# Patient Record
Sex: MALE | Race: WHITE
[De-identification: names, ages, dates, MRNs, and addresses within clinical notes are randomized per-mention and may not be internally consistent; named-entity substitution may affect disease eponyms.]

---

## 2022-09-25 ENCOUNTER — HOSPITAL ENCOUNTER (INPATIENT)
Dept: HOSPITAL 97 - ER | Age: 57
LOS: 2 days | Discharge: HOME | DRG: 544 | End: 2022-09-27
Attending: INTERNAL MEDICINE | Admitting: HOSPITALIST
Payer: SELF-PAY

## 2022-09-25 VITALS — OXYGEN SATURATION: 98 %

## 2022-09-25 VITALS — BODY MASS INDEX: 26.6 KG/M2

## 2022-09-25 DIAGNOSIS — Z79.899: ICD-10-CM

## 2022-09-25 DIAGNOSIS — Z28.310: ICD-10-CM
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
## 2022-09-25 DIAGNOSIS — Z20.822: ICD-10-CM

## 2022-09-25 DIAGNOSIS — Z60.2: ICD-10-CM

## 2022-09-25 DIAGNOSIS — R33.8: ICD-10-CM

## 2022-09-25 DIAGNOSIS — Z87.891: ICD-10-CM

## 2022-09-25 DIAGNOSIS — C61: ICD-10-CM

## 2022-09-25 DIAGNOSIS — M84.58XA: Primary | ICD-10-CM

## 2022-09-25 DIAGNOSIS — M13.862: ICD-10-CM

## 2022-09-25 LAB
ALBUMIN SERPL BCP-MCNC: 4.4 G/DL (ref 3.4–5)
ALP SERPL-CCNC: 80 U/L (ref 45–117)
ALT SERPL W P-5'-P-CCNC: 54 U/L (ref 12–78)
AST SERPL W P-5'-P-CCNC: 36 U/L (ref 15–37)
BUN BLD-MCNC: 14 MG/DL (ref 7–18)
GLUCOSE SERPLBLD-MCNC: 118 MG/DL (ref 74–106)
HCT VFR BLD CALC: 44.6 % (ref 39.6–49)
LIPASE SERPL-CCNC: 119 U/L (ref 73–393)
LYMPHOCYTES # SPEC AUTO: 1.4 K/UL (ref 0.7–4.9)
MCV RBC: 91.1 FL (ref 80–100)
PMV BLD: 7.3 FL (ref 7.6–11.3)
POTASSIUM SERPL-SCNC: 4.4 MMOL/L (ref 3.5–5.1)
RBC # BLD: 4.9 M/UL (ref 4.33–5.43)

## 2022-09-25 PROCEDURE — 99285 EMERGENCY DEPT VISIT HI MDM: CPT

## 2022-09-25 PROCEDURE — 74177 CT ABD & PELVIS W/CONTRAST: CPT

## 2022-09-25 PROCEDURE — 85610 PROTHROMBIN TIME: CPT

## 2022-09-25 PROCEDURE — 51702 INSERT TEMP BLADDER CATH: CPT

## 2022-09-25 PROCEDURE — 80053 COMPREHEN METABOLIC PANEL: CPT

## 2022-09-25 PROCEDURE — 71260 CT THORAX DX C+: CPT

## 2022-09-25 PROCEDURE — 82565 ASSAY OF CREATININE: CPT

## 2022-09-25 PROCEDURE — 36415 COLL VENOUS BLD VENIPUNCTURE: CPT

## 2022-09-25 PROCEDURE — 83690 ASSAY OF LIPASE: CPT

## 2022-09-25 PROCEDURE — 87811 SARS-COV-2 COVID19 W/OPTIC: CPT

## 2022-09-25 PROCEDURE — 78306 BONE IMAGING WHOLE BODY: CPT

## 2022-09-25 PROCEDURE — 85025 COMPLETE CBC W/AUTO DIFF WBC: CPT

## 2022-09-25 PROCEDURE — 81003 URINALYSIS AUTO W/O SCOPE: CPT

## 2022-09-25 PROCEDURE — 85730 THROMBOPLASTIN TIME PARTIAL: CPT

## 2022-09-25 PROCEDURE — 84153 ASSAY OF PSA TOTAL: CPT

## 2022-09-25 RX ADMIN — HYDROMORPHONE HYDROCHLORIDE PRN MG: 1 INJECTION, SOLUTION INTRAMUSCULAR; INTRAVENOUS; SUBCUTANEOUS at 23:33

## 2022-09-25 RX ADMIN — Medication SCH ML: at 21:00

## 2022-09-25 RX ADMIN — SODIUM CHLORIDE SCH MLS: 0.9 INJECTION, SOLUTION INTRAVENOUS at 20:27

## 2022-09-25 SDOH — SOCIAL STABILITY - SOCIAL INSECURITY: PROBLEMS RELATED TO LIVING ALONE: Z60.2

## 2022-09-25 NOTE — EDPHYS
Physician Documentation                                                                           

 [facility]                                                                 

Name: Rafael Vásquez                                                                                

Age: 57 yrs                                                                                       

Sex: Male                                                                                         

: 1965                                                                                   

MRN: [id number]                                                                                   

Arrival Date: 2022                                                                          

Time: 11:00                                                                                       

Account#: [id number]                                                                            

Bed 6                                                                                             

Private MD:                                                                                       

ED Physician Suleman Quinonez                                                                         

HPI:                                                                                              

                                                                                             

11:25 This 57 yrs old Male presents to ER via Ambulatory with complaints of Flank Pain.       jmm 

11:25 The patient complains of pain in the right flank. Onset: The symptoms/episode           jmm 

      began/occurred acutely, last night. Modifying factors: The symptoms are alleviated by       

      remaining still, the symptoms are aggravated by movement, palpation/percussion.             

      Associated signs and symptoms: Pertinent negatives: vomiting. Is a 57-year-old male         

      with no known chronic medical conditions presents emerged part with complaints of right     

      flank/rib pain beginning last night while he was performing heavy lifting. Denies           

      shortness of breath, vomiting, dysuria..                                                    

                                                                                                  

Historical:                                                                                       

- Allergies:                                                                                      

11:14 No Known Allergies;                                                                     tw2 

- Home Meds:                                                                                      

11:14 None [Active];                                                                          tw2 

- PMHx:                                                                                           

11:14 None;                                                                                   tw2 

- PSHx:                                                                                           

11:14 None;                                                                                   tw2 

                                                                                                  

- Immunization history:: Client reports having NOT received the Covid vaccine.                    

- Social history:: Smoking status: Patient denies any tobacco usage or history of.                

                                                                                                  

                                                                                                  

ROS:                                                                                              

11:25 Constitutional: Negative for fever, chills, and weight loss, Cardiovascular: Negative   jmm 

      for chest pain, palpitations, and edema, Respiratory: Negative for shortness of breath,     

      cough, wheezing, and pleuritic chest pain.                                                  

11:25 Back: Positive for flank pain.                                                              

11:25 All other systems are negative.                                                             

                                                                                                  

Exam:                                                                                             

11:25 Head/Face:  atraumatic. Eyes:  EOMI, no conjunctival erythema appreciated ENT:  Moist   jmm 

      Mucus Membranes Neck:  Trachea midline, Supple Cardiovascular:  Regular rate and            

      rhythm.  No edema appreciated                                                               

11:25 Respiratory:  Normal respirations, no respiratory distress appreciated Back:  Normal        

      ROM Skin:  General appearance color normal MS/ Extremity:  Moves all extremities, no        

      obvious deformities appreciated, no edema noted to the lower extremities  Neuro:  Awake     

      and alert Psych:  Behavior is normal, Mood is normal, Patient is cooperative and            

      pleasant                                                                                    

11:25 Constitutional: The patient appears alert, awake, anxious, uncomfortable.                   

11:25 Chest/axilla: Palpation: tenderness, that is moderate, of the  right lateral posterior      

      chest.                                                                                      

                                                                                                  

Vital Signs:                                                                                      

11:11  / 113; Pulse 98; Resp 17; Temp 98.8(TE); Pulse Ox 97% on R/A; Weight 79.38 kg    tw2 

      (R); Height 5 ft. 8 in. (172.72 cm); Pain 10/10;                                            

15:24  / 104; Pulse 93; Resp 18; Pulse Ox 100% on R/A;                                  ll1 

16:30  / 102; Pulse 93; Pulse Ox 96% ;                                                  kr3 

18:32  / 100; Pulse 92; Resp 18; Pulse Ox 97% ;                                         kr3 

11:11 Body Mass Index 26.61 (79.38 kg, 172.72 cm)                                             tw2 

                                                                                                  

MDM:                                                                                              

11:26 Patient medically screened.                                                             [facility] 

14:12 Data reviewed: vital signs, nurses notes. Counseling: I had a detailed discussion with  [facility] 

      the patient and/or guardian regarding: the historical points, exam findings, and any        

      diagnostic results supporting the discharge/admit diagnosis, lab results, radiology         

      results, the need to transfer to another facility. ED course: We do not have urology on     

      call today. Patient has a preference for [facility]. .                      

15:08 ED course: [facility]. I did contact urology from [facility] in   [facility] 

      the [facility] who stated that the patient did not need inpatient care on a       

      urologic standpoint. I discussed the patient with the hospitalist who declined              

      transfer. I discussed the patient  who recommends admission to this hospital to     

      contact Dr. Sears. Dr. Sears was contacted via text..                                         

                                                                                                  

                                                                                             

11:25 Order name: CBC with Diff; Complete Time: 12:32                                         [facility] 

                                                                                             

11:25 Order name: CMP; Complete Time: 12:32                                                   [facility] 

                                                                                             

11:25 Order name: Lipase; Complete Time: 12:32                                                [facility] 

                                                                                             

13:29 Order name: SARS RAPID; Complete Time: 14:02                                              

                                                                                             

13:45 Order name: Urine Dipstick-Ancillary; Complete Time: 13:45                              [facility]

                                                                                             

16:52 Order name: CBC with Automated Diff                                                     [facility]

                                                                                             

16:52 Order name: CBC with Automated Diff                                                     [facility]

                                                                                             

16:52 Order name: Comprehensive Metabolic Panel                                               [facility]

                                                                                             

16:52 Order name: Comprehensive Metabolic Panel                                               [facility]

                                                                                             

16:52 Order name: Protime (+INR)                                                              [facility]

                                                                                             

16:52 Order name: Protime (+INR)                                                              [facility]

                                                                                             

16:52 Order name: PSA Diagnostic                                                              [facility]

                                                                                             

16:52 Order name: PSA Diagnostic                                                              [facility]

                                                                                             

16:52 Order name: PTT, Activated Partial Thromb                                               [facility]

                                                                                             

11:25 Order name: IV Saline Lock; Complete Time: 12:59                                        [facility] 

                                                                                             

11:25 Order name: Labs collected and sent; Complete Time: 12:59                               [facility] 

                                                                                             

11:25 Order name: Urine Dipstick-Ancillary (obtain specimen); Complete Time: 13:43            [facility] 

                                                                                             

11:25 Order name: CT Chest, Abdomen, Pelvis - W/Contrast; Complete Time: 12:58                [facility] 

                                                                                             

13:10 Order name: Jack; Complete Time: 13:13                                                 [facility] 

                                                                                             

16:52 Order name: Regular                                                                     [facility]

                                                                                             

16:52 Order name: PTT, Activated Partial Thromb                                               [facility]

                                                                                             

16:52 Order name: Bone Imaging Whole Body                                                     [facility]

                                                                                             

16:52 Order name: Bone Imaging Whole Body                                                     [facility]

                                                                                                  

Administered Medications:                                                                         

13:05 Drug: morphine 4 mg Route: IVP; Infused Over: 4 mins; Site: right antecubital;          kr3 

15:22 Follow up: Response: No adverse reaction; RASS: Alert and Calm (0)                      ll1 

13:05 Drug: Zofran (Ondansetron) 4 mg Route: IVP; Site: right antecubital;                    kr3 

15:21 Follow up: Response: No adverse reaction; RASS: Alert and Calm (0)                      ll1 

13:22 Drug: Dilaudid (HYDROmorphone) 1 mg Route: IVP; Site: left antecubital;                 kr3 

15:21 Follow up: Response: No adverse reaction; RASS: Alert and Calm (0)                      ll1 

15:20 Drug: Dilaudid (HYDROmorphone) 1 mg Route: IVP; Site: left antecubital;                 ll1 

                                                                                                  

                                                                                                  

Disposition Summary:                                                                              

22 15:34                                                                                    

Hospitalization Ordered                                                                           

      Hospitalization Status: Observation                                                     dann 

      Provider: Sabrina Sears 

      Location: Telemetry/MedSurg (observation)                                               jmm 

      Condition: Stable(22 15:34)                                                       jmm 

      Problem: new(22 15:34)                                                            jmm 

      Symptoms: are unchanged(22 15:34)                                                 jmm 

      Bed/Room Type: Standard                                                                 m 

      Room Assignment: 404(22 17:57)                                                    dw  

      Diagnosis                                                                                   

        - Right 9th and 10th Fracture - Intractable Pain                                      jmm 

      Forms:                                                                                      

        - Medication Reconciliation Form                                                      jmm 

        - SBAR form                                                                           jmm 

Addendum:                                                                                         

2022                                                                                        

     07:28 Co-signature as Attending Physician, Suleman Quinonez MD.                                    r
n

                                                                                                  

Signatures:                                                                                       

Dispatcher [employer]                           EDBasia Orantes RN                        RN   dw                                                   

Emigdio Aguirre, PA                       PA   m                                                  

Suleman Quinonez MD MD   rn                                                   

Angely Malone RN                          RN   tw2                                                  

Arya Dolan RN                       RN   ll1                                                  

Hanane Adan RN                        RN   kr3                                                  

                                                                                                  

Corrections: (The following items were deleted from the chart)                                    

                                                                                             

14:49 14:14 Urology jmm                                                                       m 

14:49 14:14 [facility]                                                       m 

14:49 14:14 Higher level of care jmm                                                          m 

14:49 14:14 Stable jmm                                                                        jmm 

14:49 14:14 new jmm                                                                           jmm 

14:49 14:14 are unchanged jmm                                                                 m 

14:49 14:14 Bladder Outlet Obstruction jmm                                                    jmm 

14:49 14:14 Rib fracture jmm                                                                  jmm 

14:49 14:14 Bladder Mass jmm                                                                  jmm 

17:57 15:34 jmm                                                                               dw  

                                                                                                  

**************************************************************************************************

## 2022-09-25 NOTE — XMS REPORT
Continuity of Care Document

                          Created on:2022



Patient:SARITA ARREDONDO

Sex:Male

:1965

External Reference #:[id number]





Demographics







                          Address                   [address]



                                                    [place] [address]

 

                          Home Phone                [phone]

 

                          Work Phone                [phone]

 

                          Email Address             NONE

 

                          Preferred Language        English

 

                          Marital Status            Unknown

 

                          Congregation Affiliation     Unknown

 

                          Race                      Unknown

 

                          Additional Race(s)        Unavailable

 

                          Ethnic Group              Unknown









Author







                          Organization              [facility]

t

 

                          Address                   [address]



                                                    [place] [address]

 

                          Phone                     [phone]









Support







                Name            Relationship    Address         Phone

 

                CARISSA MARIA DOLORES SP              [address] [phone]



                                                [id number]        



                                                [place] [address] 

 

                MARIA DOLORES FERRER SP              Unavailable     [phone]

 

                WERNER LAKHANI    OT              [address] [phone]



                                                [id number]        



                                                [place] [address] 









Care Team Providers







                    Name                Role                Phone

 

                    SAM FONTAINE   Attending Clinician Unavailable









Problems

This patient has no known problems.



Allergies, Adverse Reactions, Alerts

This patient has no known allergies or adverse reactions.



Medications

This patient has no known medications.



Procedures

This patient has no known procedures.



Encounters







        Start   End     Encounter Admission Attending Care    Care    Encounter 

Source



        Date/Time Date/Time Type    Type    Clinicians Facility Department ID   

   

 

        2020 Outpatient [facility]    MED     [id number]

    [facility]



        13:10:00 21:09:00                                                 







Results

This patient has no known results.

## 2022-09-25 NOTE — RAD REPORT
EXAM DESCRIPTION:  CT - Chest Abdomen Pelvis W Cont - 9/25/2022 12:25 pm

 

CLINICAL HISTORY:  right flank pain, chest pain

 

COMPARISON:  No comparisons

 

TECHNIQUE:  Following dynamic enhancement using 100 milliliters nonionic IV contrast, axial imaging o
f the chest, abdomen and pelvis was performed.  Biphasic technique was utilized through the abdomen.

 

No oral contrast administered.

 

All CT scans are performed using dose optimization technique as appropriate and may include automated
 exposure control or mA/KV adjustment according to patient size.

 

FINDINGS:  Lungs are clear of mass and infiltrate. Minimal lung base atelectasis present. No pleural 
effusion, pleural thickening or pneumothorax. No significant aortic or pulmonary arterial tree findin
g. Mediastinal and hilar regions show no mass or abnormal lymphadenopathy. No chest wall mass or axil
shannon lymphadenopathy. Small hiatal hernia is present. Distal esophagus near the hernia shows mild cir
cumferential wall thickening. No mass is seen. This is of on certain significance given the absence o
f symptoms.

 

Nondisplaced fractures of the posterior right ninth and tenth ribs are present. The

 

The liver, spleen and pancreas show no suspicious findings. Gallbladder and biliary tree are unremark
able.  Gallstones can be occult on CT imaging. Symmetric renal function is seen with no mass or hydro
nephrosis. No adrenal abnormalities.  Urinary bladder is distended with the dome at the umbilicus lev
el. There is a focal 11 millimeter nodularity at the prostate -bladder junction. Overall prostate gla
nd is not enlarged. A focal prostate mass or a urinary bladder base mass cannot be excluded.

 

No dilated bowel loops or focal bowel wall thickening. No acute GI findings seen.

 

No additional sites of fracture or acute bone finding identifiable.

 

No significant vascular findings.

 

 

IMPRESSION:  Nondisplaced posterior right ninth and tenth rib fractures are present. No injury to adj
acent structures.

 

Small focal 11 mm mass at the prostate-bladder junction could be a prostate mass or a bladder base ma
ss. Urinary bladder is distended with the dome of reaching the level of the umbilicus.

## 2022-09-25 NOTE — ER
Nurse's Notes                                                                                     

 [facility]                                                                 

Name: Rafael Vásquez                                                                                

Age: 57 yrs                                                                                       

Sex: Male                                                                                         

: 1965                                                                                   

MRN: [id number]                                                                                   

Arrival Date: 2022                                                                          

Time: 11:00                                                                                       

Account#: [id number]                                                                            

Bed 6                                                                                             

Private MD:                                                                                       

Diagnosis: Right 9th and 10th Fracture - Intractable Pain                                         

                                                                                                  

Presentation:                                                                                     

                                                                                             

11:11 Chief complaint: Patient states: my RIGHT side is hurting and it started last night.    tw2 

      stays constant. Coronavirus screen: At this time, the client does not indicate any          

      symptoms associated with coronavirus-19. Ebola Screen: Patient denies travel to an          

      Ebola-affected area in the 21 days before illness onset. Initial Sepsis Screen: Does        

      the patient meet any 2 criteria? HR > 90 bpm. No. Patient's initial sepsis screen is        

      negative. Does the patient have a suspected source of infection? No. Patient's initial      

      sepsis screen is negative. Initial Sepsis Screen: Does the patient meet any 2               

      criteria?. Risk Assessment: Do you want to hurt yourself or someone else? Patient           

      reports no desire to harm self or others. Onset of symptoms was 2022.         

11:11 Method Of Arrival: Ambulatory                                                           tw2 

11:11 Acuity: ALEIDA 3                                                                           tw2 

                                                                                                  

Triage Assessment:                                                                                

11:14 General: Appears uncomfortable, well groomed, Behavior is calm, cooperative,            tw2 

      appropriate for age. Pain: Complains of pain in right SIDE pain. GI: Reports right side     

      pain Patient currently denies nausea, vomiting.                                             

                                                                                                  

Historical:                                                                                       

- Allergies:                                                                                      

11:14 No Known Allergies;                                                                     tw2 

- Home Meds:                                                                                      

11:14 None [Active];                                                                          tw2 

- PMHx:                                                                                           

11:14 None;                                                                                   tw2 

- PSHx:                                                                                           

11:14 None;                                                                                   tw2 

                                                                                                  

- Immunization history:: Client reports having NOT received the Covid vaccine.                    

- Social history:: Smoking status: Patient denies any tobacco usage or history of.                

                                                                                                  

                                                                                                  

Screenin:16 Abuse screen: Denies threats or abuse. Nutritional screening: No deficits noted.        tw2 

      Tuberculosis screening: No symptoms or risk factors identified. Fall Risk None              

      identified.                                                                                 

                                                                                                  

Assessment:                                                                                       

13:00 Reassessment: No changes from previously documented assessment.                         ll1 

13:46 General: Appears distressed, uncomfortable, Behavior is cooperative, appropriate for    kr3 

      age, restless.                                                                              

15:00 Reassessment: No changes from previously documented assessment. Patient and/or family   kr3 

      updated on plan of care and expected duration. Pain level reassessed.                       

16:00 Reassessment: No changes from previously documented assessment. Patient and/or family   kr3 

      updated on plan of care and expected duration. Pain level reassessed.                       

17:00 Reassessment: No changes from previously documented assessment. Patient and/or family   kr3 

      updated on plan of care and expected duration. Pain level reassessed.                       

18:00 Reassessment: No changes from previously documented assessment. Patient and/or family   kr3 

      updated on plan of care and expected duration. Pain level reassessed.                       

                                                                                                  

Vital Signs:                                                                                      

11:11  / 113; Pulse 98; Resp 17; Temp 98.8(TE); Pulse Ox 97% on R/A; Weight 79.38 kg    tw2 

      (R); Height 5 ft. 8 in. (172.72 cm); Pain 10/10;                                            

15:24  / 104; Pulse 93; Resp 18; Pulse Ox 100% on R/A;                                  ll1 

16:30  / 102; Pulse 93; Pulse Ox 96% ;                                                  kr3 

18:32  / 100; Pulse 92; Resp 18; Pulse Ox 97% ;                                         kr3 

11:11 Body Mass Index 26.61 (79.38 kg, 172.72 cm)                                             tw2 

                                                                                                  

ED Course:                                                                                        

11:00 Patient arrived in ED.                                                                  mr  

11:02 Emigdio Aguirre PA is PHCP.                                                              jmm 

11:03 Suleman Quinonez MD is Attending Physician.                                                jmm 

11:11 Arm band placed on.                                                                     tw2 

11:14 Triage completed.                                                                       tw2 

12:03 Radiology exam delayed due to lab results not completed at this time. (BUN/Creatinine)  mw3 

      IV insertion attempt and/or patient not having appropriate IV at this time.                 

12:11 Initial lab(s) drawn, by me, sent to lab. Inserted saline lock: 20 gauge in left        tm3 

      antecubital area, using aseptic technique.                                                  

12:27 CT Chest, Abdomen, Pelvis - W/Contrast In Process Unspecified.                          EDMS

13:00 Patient placed in an exam room, on a stretcher.                                         ll1 

13:02 Hanane Adan, RN is Primary Nurse.                                                      kr3 

13:45 Jack cath inserted, using sterile technique, 18 Fr., by me, urine specimen collected.  kr3 

      other coude.                                                                                

14:06 initiated a transfer with Clarisse from the [facility] at the        eb  

      request of the patient.                                                                     

14:19 per Clarisse, [facility] and [facility] will have to decline at    

      this time both facilities are at capacity.                                                  

14:22 initiated a transfer with Vikki from the [facility]/.                   eb  

14:27 connected Dr. Farley the urologist on call for [facility] with Emigdio Gomes for patient    eb  

      transfer consultation.                                                                      

14:43 connected Dr. Coronado the hospitalist on call for [facility] with Emigdio Gomes for patient   eb  

      transfer consultation.                                                                      

15:34 Sabrina Sears MD is Hospitalizing Provider.                                           nelda 

18:30 No provider procedures requiring assistance completed. Patient admitted, IV remains in  kr3 

      place.                                                                                      

18:31 Patient has correct armband on for positive identification. Bed in low position. Call   kr3 

      light in reach.                                                                             

                                                                                                  

Administered Medications:                                                                         

13:05 Drug: morphine 4 mg Route: IVP; Infused Over: 4 mins; Site: right antecubital;          kr3 

15:22 Follow up: Response: No adverse reaction; RASS: Alert and Calm (0)                      ll1 

13:05 Drug: Zofran (Ondansetron) 4 mg Route: IVP; Site: right antecubital;                    kr3 

15:21 Follow up: Response: No adverse reaction; RASS: Alert and Calm (0)                      ll1 

13:22 Drug: Dilaudid (HYDROmorphone) 1 mg Route: IVP; Site: left antecubital;                 kr3 

15:21 Follow up: Response: No adverse reaction; RASS: Alert and Calm (0)                      ll1 

15:20 Drug: Dilaudid (HYDROmorphone) 1 mg Route: IVP; Site: left antecubital;                 ll1 

                                                                                                  

                                                                                                  

Medication:                                                                                       

18:31 VIS not applicable for this client.                                                     kr3 

                                                                                                  

Outcome:                                                                                          

14:14 ER care complete, transfer ordered by MD.                                               nelda 

15:34 Decision to Hospitalize by Provider.                                                    nelda 

18:30 Admitted to Med/surg accompanied by tech, via stretcher, room 404, with chart, Report   kr3 

      called to  Kitty CHIN RN on                                                                 

18:30 Condition: stable                                                                           

18:30 Instructed on the need for admit.                                                           

18:54 Patient left the ED.                                                                    ll1 

                                                                                                  

Signatures:                                                                                       

[facility]                           EDMS                                                 

Aleksandra Ken                                tm3                                                  

Emigdio Aguirre PA PA   hernan                                                  

Jeramie, Sindy                                 mr                                                   

Angely Malone, RN                          RN   tw2                                                  

Theresa Underwood Michelle                             3                                                  

Arya Dolan RN                       RN   ll1                                                  

Hanane Adan RN                        RN   kr3                                                  

                                                                                                  

Corrections: (The following items were deleted from the chart)                                    

14:29 14:06 initiated a transfer with Clarisse from the [facility]  

18:02 18:01 Reassessment: No changes from previously documented assessment. Patient and/or    kr3 

      family updated on plan of care and expected duration. Pain level reassessed. kr3            

                                                                                                  

**************************************************************************************************

## 2022-09-26 LAB
ALBUMIN SERPL BCP-MCNC: 3.8 G/DL (ref 3.4–5)
ALP SERPL-CCNC: 86 U/L (ref 45–117)
ALT SERPL W P-5'-P-CCNC: 45 U/L (ref 12–78)
AST SERPL W P-5'-P-CCNC: 28 U/L (ref 15–37)
BUN BLD-MCNC: 18 MG/DL (ref 7–18)
GLUCOSE SERPLBLD-MCNC: 106 MG/DL (ref 74–106)
HCT VFR BLD CALC: 41.2 % (ref 39.6–49)
INR BLD: 1.04
LYMPHOCYTES # SPEC AUTO: 1.1 K/UL (ref 0.7–4.9)
MCV RBC: 90.8 FL (ref 80–100)
PMV BLD: 7.5 FL (ref 7.6–11.3)
POTASSIUM SERPL-SCNC: 4 MMOL/L (ref 3.5–5.1)
RBC # BLD: 4.54 M/UL (ref 4.33–5.43)

## 2022-09-26 RX ADMIN — HYDROMORPHONE HYDROCHLORIDE PRN MG: 1 INJECTION, SOLUTION INTRAMUSCULAR; INTRAVENOUS; SUBCUTANEOUS at 20:38

## 2022-09-26 RX ADMIN — Medication SCH ML: at 20:38

## 2022-09-26 RX ADMIN — Medication SCH: at 07:39

## 2022-09-26 RX ADMIN — HYDROMORPHONE HYDROCHLORIDE PRN MG: 1 INJECTION, SOLUTION INTRAMUSCULAR; INTRAVENOUS; SUBCUTANEOUS at 15:08

## 2022-09-26 RX ADMIN — HYDROMORPHONE HYDROCHLORIDE PRN MG: 1 INJECTION, SOLUTION INTRAMUSCULAR; INTRAVENOUS; SUBCUTANEOUS at 05:15

## 2022-09-26 RX ADMIN — SODIUM CHLORIDE SCH: 0.9 INJECTION, SOLUTION INTRAVENOUS at 06:20

## 2022-09-26 RX ADMIN — HYDROMORPHONE HYDROCHLORIDE PRN MG: 1 INJECTION, SOLUTION INTRAMUSCULAR; INTRAVENOUS; SUBCUTANEOUS at 10:40

## 2022-09-26 RX ADMIN — SODIUM CHLORIDE SCH MLS: 0.9 INJECTION, SOLUTION INTRAVENOUS at 19:40

## 2022-09-26 RX ADMIN — SODIUM CHLORIDE SCH MLS: 0.9 INJECTION, SOLUTION INTRAVENOUS at 09:43

## 2022-09-26 NOTE — P.HP
Certification for Inpatient


Patient admitted to: Observation


With expected LOS: <2 Midnights


Patient will require the following post-hospital care: None


Practitioner: I am a practitioner with admitting privileges, knowledge of 

patient current condition, hospital course, and medical plan of care.


Services: Services provided to patient in accordance with Admission requirements

found in Title 42 Section 412.3 of the Code of Federal Regulations





Patient History


Date of Service: 09/25/22


Reason for admission: Right flank tenderness


History of Present Illness: 





 patient is a 57-year-old gentleman came to the hospital with right flank 

tenderness.  Pain was mainly in the right ribcage.  Patient also noted to have 

bladder distension for ER.  Patient denies any dysuria.   He denies any urinary 

dribbling.  He denies any dysuria.  He denies any weight loss.  He really is 

unable to give me much history.  He did get some pain medication but he does not

seem to recall a lot of with pain going on with him.  He does not live with 

anyone.  He has a girlfriend who lives in [place].  Patient works a night shift 

job.  The pain started after he was moving ice packs.  Patient had a CT scan 

done here which revealed prostate mass as well as a rib fracture in the 9/10th 

rib.  No comment on pathologic fracture.  Will check a PSA level.  Will also get

bone scan.  Patient will be admitted for observation.


Allergies





No Known Allergies Allergy (Verified 09/25/22 23:22)


   








- Past Medical/Surgical History


Diabetic: No


Past Medical History: Patient denies medical history


Past Surgical History: Patient denies surgical history





- Family History


  ** Brother


Medical History: Cancer


Family History: Reviewed- Non-Contributory


Notes: colon cancer





- Social History


Smoking Status: Former smoker


Alcohol use: Yes


CD- Drugs: No


Caffeine use: Yes


Place of Residence: Home





Review of Systems


10-point ROS is otherwise unremarkable





Physical Examination





- Vital Signs


Temperature: 97.2 F


Blood Pressure: 172/90


Pulse: 65


Respirations: 18


Pulse Ox (%): 98





- Physical Exam


General: Alert, In no apparent distress, Oriented x3


HEENT: Atraumatic, PERRLA, Mucous membr. moist/pink, EOMI, Sclerae nonicteric


Neck: Supple, 2+ carotid pulse no bruit, No LAD, Without JVD or thyroid 

abnormality


Respiratory: Clear to auscultation bilaterally, Normal air movement


Cardiovascular: Regular rate/rhythm, Normal S1 S2


Gastrointestinal: Normal bowel sounds, Soft and benign, Non-distended, No 

tenderness


Musculoskeletal: Tenderness (right flank region)


Integumentary: No rashes


Neurological: Normal gait, Normal speech, Normal strength at 5/5 x4 extr, Normal

tone, Normal affect


Lymphatics: No axilla or inguinal lymphadenopathy





- Studies


Laboratory Data (last 24 hrs)





09/25/22 12:05: Sodium 131 L, Potassium 4.4, BUN 14, Creatinine 1.02, Glucose 

118 H, Total Bilirubin 0.6, AST 36, ALT 54, Alkaline Phosphatase 80, Lipase 119


09/25/22 12:05: WBC 10.10, Hgb 15.4, Hct 44.6, Plt Count 392








Assessment & Plan





- Problems (Diagnosis)


(1) Rib fractures


Current Visit: Yes   Status: Acute   





(2) Urinary retention


Current Visit: Yes   Status: Acute   





(3) Prostate mass


Current Visit: Yes   Status: Acute   





- Plan





  Plan:


1. pain control


2.  PSA level 


3. reassess neurologic status in a.m.


4. bone scan to evaluate metastatic disease


5. repeat labs in a.m.


6.  Observation hospitalization and anticipate discharge in a.m. if workup is 

negative.  If PSA elevation outpatient urology referral as well as possibly 

outpt oncology referral pending bone scan.


Discharge Plan: Home


Plan to discharge in: 24 Hours





- Advance Directives


Does patient have a Living Will: No


Does patient have a Durable POA for Healthcare: No





- Code Status/Comfort Care


Code Status Assessed: Yes


Code Status: Full Code


Critical Care: No


Time Spent Managing PTS Care (In Minutes): 45

## 2022-09-26 NOTE — RAD REPORT
EXAM DESCRIPTION:  NM - Bone Imaging Whole Body - 9/26/2022 12:04 pm

 

CLINICAL HISTORY:  Rib fractures. Metastatic disease

 

COMPARISON:  September 25, 2022 CT scan

 

TECHNIQUE:  26.8 mci Tc MDPwas administered intravenously.

 

Anterior and posterior whole body images were obtained.

 

FINDINGS:  Minimally increased radiotracer uptake involves several lower posterior right ribs.

 

Increased radiotracer activity involves the left knee and shoulders.

 

IMPRESSION:  Minimal increased radiotracer activity involve several lower posterior right ribs. CT sc
an demonstrates nondisplaced fractures which may be subacute

 

Increased radiotracer activity involving left knee and shoulders probably secondary to arthritis

## 2022-09-26 NOTE — P.PN
Subjective


Date of Service: 09/26/22


Chief Complaint: Right flank tenderness


Subjective: No new changes


No acute events overnight. He reports no urinary symptoms since his Jack 

catheter was placed. He continues to experience right rib pain.





Review of Systems


10-point ROS is otherwise unremarkable


Musculoskeletal: Other (right rib pain)





Physical Examination





- Vital Signs


Temperature: 97.7 F


Blood Pressure: 156/94


Pulse: 79


Respirations: 14


Pulse Ox (%): 94





- Physical Exam


General: Alert, In no apparent distress, Oriented x3


HEENT: Atraumatic, PERRLA, Mucous membr. moist/pink, EOMI, Sclerae nonicteric


Neck: Supple, JVD not distended


Respiratory: Clear to auscultation bilaterally, Normal air movement


Cardiovascular: No edema, Regular rate/rhythm, Normal S1 S2, No gallops, No 

rubs, No murmurs


Gastrointestinal: Normal bowel sounds, Soft and benign, Non-distended, No 

tenderness, No rebound, No guarding


Musculoskeletal: No clubbing, Other (tenderness to palpation along right rib 

cage)


Integumentary: No rashes


Neurological: Normal speech, Cranial nerves 3-12 intact, Normal affect


Urinary: Jack catheter





Assessment And Plan





- Plan


# Non-Displaced Right Posterior 9th and 10th Rib Fractures - concern for 

Pathologic Fractures


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


- Discussed these findings with him and explained high concern for urology 

malignancy with pathologic bone fractures


   - Evaluation thus far:


      - PSA = 2.56


      - Bone scan = "Minimal increased radiotracer activity involve several 

lower posterior right ribs. CT scan demonstrates nondisplaced fractures which 

may be subacute. Increased radiotracer activity involving left knee and 

shoulders probably secondary to arthritis "


- Explained that he will need to have an immediate outpatient oncologic 

evaluation and he verbalized understanding


- I spoke with Dr. Sharma (Urology) who will arrange immediate post-hospitali

zation follow-up


- Attempted to discharge today, but he stated that his rib pain is too 

significant to move


   - Will keep in hospital one more day due to intractable pain


- If doing well in the morning, possible discharge tomorrow








Ashok Miller M.D.


Discharge Plan: Home


Plan to discharge in: 24 Hours

## 2022-09-27 VITALS — DIASTOLIC BLOOD PRESSURE: 70 MMHG | TEMPERATURE: 97.3 F | SYSTOLIC BLOOD PRESSURE: 143 MMHG

## 2022-09-27 RX ADMIN — SODIUM CHLORIDE SCH: 0.9 INJECTION, SOLUTION INTRAVENOUS at 09:00

## 2022-09-27 RX ADMIN — HYDROCODONE BITARTRATE AND ACETAMINOPHEN PRN TAB: 5; 325 TABLET ORAL at 01:27

## 2022-09-27 RX ADMIN — HYDROMORPHONE HYDROCHLORIDE PRN MG: 1 INJECTION, SOLUTION INTRAMUSCULAR; INTRAVENOUS; SUBCUTANEOUS at 03:29

## 2022-09-27 RX ADMIN — Medication SCH: at 07:36

## 2022-09-27 RX ADMIN — HYDROCODONE BITARTRATE AND ACETAMINOPHEN PRN TAB: 5; 325 TABLET ORAL at 08:30

## 2022-09-27 NOTE — P.DS
Admission Date: 09/26/22


Discharge Date: 09/27/22


Disposition: ROUTINE DISCHARGE


Discharge Condition: GOOD


Reason for Admission: Right flank tenderness


Consultations: 


1. Urology


Hospital Course: 





DIAGNOSES


# Traumatic Mechanical Ground Level Fall complicated by Non-Displaced Right 

Posterior 9th and 10th Rib Fractures - concern for Pathologic Fractures


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





HOSPITAL COURSE:


Mr. Rafael Vásquez is a pleasant 57 year old male with no reported past medical 

history significant who was admitted to the [facility] 

on 09/25/2022 for rib pain following a mechanical ground level fall.





He was admitted to the Medicine service. Upon further evaluation, he was found 

to have an 11 mm mass between his prostate and bladder as well as non-displaced 

right posterior 9th and 10th rib fractures. Due to concern for a urologic 

malignancy, his PSA was checked and he had a bone scan. His PSA returned at 2.56

and his bone scan revealed, "minimal increased radiotracer activity involve 

several lower posterior right ribs. CT scan demonstrates nondisplaced fractures 

which may be subacute. Increased radiotracer activity involving left knee and 

shoulders probably secondary to arthritis." He was also found to have urinary 

retention, for which a Jack catheter was placed. I consulted Urology (Dr. Sharma), who agreed that he could be managed as an outpatient. He recommended 

discharge with outpatient follow-up as well as a Medical Oncology referral. He 

was discharged with an indwelling Jack catheter, which will be removed at his 

Urology appointment.





On 09/27/2022, he was seen on morning rounds and deemed medically stable for 

discharge. He was discharged with instructions to schedule follow-up 

appointments with his PCP in 3-5 days, with Urology (Dr. Sharma) in 3-5 days, 

and with Medical Oncology as soon as possible. He was provided a prescription 

for hydrocodone-acetaminophen. The patient and family members were given the 

opportunity to ask questions and reported no further questions. Furthermore, all

questions were answered to the best of my ability.





A PDMP review was completed, which revealed that, in the last year, he had 0 

controlled substance prescriptions, from 0 providers, to 0 pharmacies. His 

opioid overdose risk score is 0.





Today, I personally spent 25 minutes on his case, of which greater than 50% of 

the time was spent in patient education, counseling, and coordination of care as

described above.








- Physical Exam


General: Alert, In no apparent distress, Oriented x3


HEENT: Atraumatic, PERRLA, Mucous membr. moist/pink, EOMI, Sclerae nonicteric


Neck: Supple, JVD not distended


Respiratory: Clear to auscultation bilaterally, Normal air movement


Cardiovascular: No edema, Regular rate/rhythm, Normal S1 S2, No gallops, No 

rubs, No murmurs


Gastrointestinal: Normal bowel sounds, Soft and benign, Non-distended, No 

tenderness, No rebound, No guarding


Musculoskeletal: No clubbing, Other (tenderness to palpation along right rib 

cage)


Integumentary: No rashes


Neurological: Normal speech, Cranial nerves 3-12 intact, Normal affect


Urinary: Jack catheter








Vital Signs/Physical Exam: 














Temp Pulse Resp BP Pulse Ox


 


 97.3 F   65   14   143/70 H  98 


 


 09/27/22 08:00  09/27/22 08:00  09/27/22 08:00  09/27/22 08:00  09/27/22 08:00








Laboratory Data at Discharge: 














WBC  9.10 K/uL (4.3-10.9)   09/26/22  03:53    


 


Hgb  14.3 g/dL (13.6-17.9)   09/26/22  03:53    


 


Hct  41.2 % (39.6-49.0)   09/26/22  03:53    


 


Plt Count  292 K/uL (152-406)   09/26/22  03:53    


 


PT  11.5 SECONDS (9.5-12.5)   09/26/22  03:53    


 


INR  1.04   09/26/22  03:53    


 


APTT  30.9 SECONDS (24.3-36.9)   09/26/22  03:53    


 


Sodium  134 mmol/L (136-145)  L  09/26/22  03:53    


 


Potassium  4.0 mmol/L (3.5-5.1)   09/26/22  03:53    


 


BUN  18 mg/dL (7-18)   09/26/22  03:53    


 


Creatinine  0.91 mg/dL (0.55-1.3)   09/26/22  03:53    


 


Glucose  106 mg/dL ()   09/26/22  03:53    


 


Total Bilirubin  1.5 mg/dL (0.2-1.0)  H  09/26/22  03:53    


 


AST  28 U/L (15-37)   09/26/22  03:53    


 


ALT  45 U/L (12-78)   09/26/22  03:53    


 


Alkaline Phosphatase  86 U/L ()   09/26/22  03:53    


 


Lipase  119 U/L ()   09/25/22  12:05    








Home Medications: 








Docusate [Colace Cap] 100 mg PO BID #60 cap 09/27/22 


Lidocaine [Lidocaine Pain Relief] 1 each TP DAILY PRN #1 09/27/22 


Polyethylene Glycol 3350 [Miralax] 17 gm PO DAILY PRN #30 09/27/22 


RX: Hydrocodone 5/APAP 325 [Norco 5/325*] 1 tab PO Q6H PRN 3 Days #12 tab 

09/27/22 





New Medications: 


Docusate [Colace Cap] 100 mg PO BID #60 cap


Lidocaine [Lidocaine Pain Relief] 1 each TP DAILY PRN #1


 PRN Reason: Pain Scale 5-7 (Moderate)


Polyethylene Glycol 3350 [Miralax] 17 gm PO DAILY PRN #30


 PRN Reason: Constipation


RX: Hydrocodone 5/APAP 325 [Norco 5/325*] 1 tab PO Q6H PRN 3 Days #12 tab


 PRN Reason: Pain Scale 8-10 (Severe)


Physician Discharge Instructions: 


1. Please schedule a follow-up appointment with your PCP in 3-5 days


   - Please speak with with your PCP regarding pain medication refills if pain 

medicine is still needed


   - Please use over-the-counter lidocaine patches as needed


   - Please use Colace and MiraLAX as needed as pain medications may cause 

constipation


2. Please schedule a follow-up appointment with Urology (Dr. Sharma) in 3-5 

days


   - He will discuss further evaluation of your prostate/bladder mass


   - Your urine catheter will need to be removed at the Urology appointment


3. Please schedule a consultation with an Oncologist (Cancer Doctor) as soon as 

possible





If you have any questions regarding your hospital stay, please call 

[phone].


Diet: AHA


Activity: Ad gil


Followup: 


Gary Sharma [ACTIVE - CAN ADMIT] - 1 Week (urologist- call to schedule an 

appointment)


Time spent managing pt's care (in minutes): 25